# Patient Record
(demographics unavailable — no encounter records)

---

## 2024-10-21 NOTE — REVIEW OF SYSTEMS
[Nasal Congestion] : nasal congestion [Sinus Problems] : sinus problems [Chest Tightness] : chest tightness [Dyspnea] : dyspnea [Negative] : Endocrine

## 2024-10-21 NOTE — ASSESSMENT
[FreeTextEntry1] : 37 yo M. with PMH of COVID (2021), frequent URI/Sinus infections who presents for follow-up of SOB  August 2024 -  Reports about 1 year of intermittent SOB, thought at first it was due to disc bulges/back pain preventing him from taking deep breaths. Has seen Chiropractors, Pain management for above. Was also seen by ENT for sinus problems/frequent infections, had video laryngoscopy with generally normal results. Was found to have LEFT cervical lymphadenopathy, underwent US of neck with results also normal. Sees an Allergist for allergies to cats, dogs, trees etc. On Omeprazole/Famotidine as well for presumed GERD. Most recent CXR clear - August 12, 2024   During our last visit he was COVID positive, CPT done - obstructive lung disease.  started on Symbicort but only recently approved, used about for 2 days. Saw ENT again - Was told he had different changes related to GERD. 2 weeks ago, traveled to [place], took abx for short time for URI symptoms.  still having difficulty with deep inhalations has alot of throat clearing - was started on Omeprazole and Famotidine  Will see Cardiology    On exam, lungs are clear, no wheezing or rhonchi, in NAD.  Care plans discussed - given continued symptoms he is ordered for CT Chest and trial of Breztri (160mcg/9mcg/4.8mcg) 2 puffs BID for management of SOB. Recommend he see Cardiology and improve compliance with Famotidine 20mg BID

## 2024-10-21 NOTE — PHYSICAL EXAM
[No Acute Distress] : no acute distress [Normal Oropharynx] : normal oropharynx [Normal Appearance] : normal appearance [No Neck Mass] : no neck mass [Normal Rate/Rhythm] : normal rate/rhythm [Normal S1, S2] : normal s1, s2 [No Murmurs] : no murmurs [No Resp Distress] : no resp distress [Clear to Auscultation Bilaterally] : clear to auscultation bilaterally [No Abnormalities] : no abnormalities [Benign] : benign [Normal Gait] : normal gait [No Clubbing] : no clubbing [No Cyanosis] : no cyanosis [No Edema] : no edema [FROM] : FROM [Normal Color/ Pigmentation] : normal color/ pigmentation [No Focal Deficits] : no focal deficits [Oriented x3] : oriented x3 [Normal Affect] : normal affect [TextBox_11] : lymphadenopathy , left cervical

## 2024-11-25 NOTE — ASSESSMENT
[FreeTextEntry1] : 37 yo M. with PMH of COVID (2021), frequent URI/Sinus infections who presents for follow-up of SOB  August 2024 -  Reports about 1 year of intermittent SOB, thought at first it was due to disc bulges/back pain preventing him from taking deep breaths. Has seen Chiropractors, Pain management for above. Was also seen by ENT for sinus problems/frequent infections, had video laryngoscopy with generally normal results. Was found to have LEFT cervical lymphadenopathy, underwent US of neck with results also normal. Sees an Allergist for allergies to cats, dogs, trees etc. On Omeprazole/Famotidine as well for presumed GERD. Most recent CXR clear - August 12, 2024   During our last visit he was COVID positive CPT done - obstructive lung disease.  Saw ENT again - Was told he had different changes related to GERD. still having difficulty with deep inhalations - have not been compliant with daily Omeprazole /Famotidine  Reports being cleared by Cardiology  Has not used Breztri   CT Chest - 3mm RUL nodule, 3mm subpleural nodule - Nov 2024  On exam, lungs are clear, no wheezing or rhonchi, in NAD. Care plans discussed - Discussed use of Breztri (160mcg/9mcg/4.8mcg) 2 puffs BID for management of SOB. Will schedule a tele-health visit in 2 weeks.

## 2024-11-25 NOTE — PHYSICAL EXAM
Update from daughter Clair    Weight  8/17 135  8/14 135  8/9 135    BP  8/17 140/82 82  8/14 130/84 86  8/9 122/64 79    [facility] sees pt Friday/will get IV as scheduled      Drip drop has 185 and a bannana do u think thats enough, I also no the air till end of weekend will be hard to breathe, so trying to keep fan circulating, right leg looks a little more swollen, she is elevating     Responded vs sx stable, yes drink is good   [No Acute Distress] : no acute distress [Normal Oropharynx] : normal oropharynx [Normal Appearance] : normal appearance [No Neck Mass] : no neck mass [Normal Rate/Rhythm] : normal rate/rhythm [Normal S1, S2] : normal s1, s2 [No Murmurs] : no murmurs [No Resp Distress] : no resp distress [Clear to Auscultation Bilaterally] : clear to auscultation bilaterally [No Abnormalities] : no abnormalities [Benign] : benign [Normal Gait] : normal gait [No Clubbing] : no clubbing [No Cyanosis] : no cyanosis [No Edema] : no edema [FROM] : FROM [Normal Color/ Pigmentation] : normal color/ pigmentation [No Focal Deficits] : no focal deficits [Oriented x3] : oriented x3 [Normal Affect] : normal affect [TextBox_11] : lymphadenopathy , left cervical